# Patient Record
Sex: MALE | Race: WHITE | URBAN - METROPOLITAN AREA
[De-identification: names, ages, dates, MRNs, and addresses within clinical notes are randomized per-mention and may not be internally consistent; named-entity substitution may affect disease eponyms.]

---

## 2018-06-27 ENCOUNTER — HISTORICAL (OUTPATIENT)
Dept: ADMINISTRATIVE | Facility: HOSPITAL | Age: 48
End: 2018-06-27

## 2024-12-17 ENCOUNTER — OFFICE VISIT (OUTPATIENT)
Dept: URGENT CARE | Facility: CLINIC | Age: 54
End: 2024-12-17
Payer: COMMERCIAL

## 2024-12-17 VITALS
RESPIRATION RATE: 18 BRPM | WEIGHT: 192 LBS | HEART RATE: 81 BPM | BODY MASS INDEX: 28.44 KG/M2 | HEIGHT: 69 IN | OXYGEN SATURATION: 96 % | DIASTOLIC BLOOD PRESSURE: 79 MMHG | TEMPERATURE: 99 F | SYSTOLIC BLOOD PRESSURE: 128 MMHG

## 2024-12-17 DIAGNOSIS — J06.9 URI WITH COUGH AND CONGESTION: Primary | ICD-10-CM

## 2024-12-17 PROCEDURE — 96372 THER/PROPH/DIAG INJ SC/IM: CPT | Mod: ,,,

## 2024-12-17 PROCEDURE — 99203 OFFICE O/P NEW LOW 30 MIN: CPT | Mod: 25,,,

## 2024-12-17 RX ORDER — PROMETHAZINE HYDROCHLORIDE AND DEXTROMETHORPHAN HYDROBROMIDE 6.25; 15 MG/5ML; MG/5ML
5 SYRUP ORAL EVERY 6 HOURS PRN
Qty: 118 ML | Refills: 0 | Status: SHIPPED | OUTPATIENT
Start: 2024-12-17 | End: 2024-12-27

## 2024-12-17 RX ORDER — BETAMETHASONE SODIUM PHOSPHATE AND BETAMETHASONE ACETATE 3; 3 MG/ML; MG/ML
9 INJECTION, SUSPENSION INTRA-ARTICULAR; INTRALESIONAL; INTRAMUSCULAR; SOFT TISSUE
Status: COMPLETED | OUTPATIENT
Start: 2024-12-17 | End: 2024-12-17

## 2024-12-17 RX ADMIN — BETAMETHASONE SODIUM PHOSPHATE AND BETAMETHASONE ACETATE 9 MG: 3; 3 INJECTION, SUSPENSION INTRA-ARTICULAR; INTRALESIONAL; INTRAMUSCULAR; SOFT TISSUE at 08:12

## 2024-12-17 NOTE — PROGRESS NOTES
"Subjective:      Patient ID: Harsha Rivera is a 54 y.o. male.    Vitals:  height is 5' 9" (1.753 m) and weight is 87.1 kg (192 lb). His oral temperature is 99.4 °F (37.4 °C). His blood pressure is 128/79 and his pulse is 81. His respiration is 18 and oxygen saturation is 96%.     Chief Complaint: Sore Throat     Patient is a 54 y.o. male who presents to urgent care with complaints of hoarseness that began 6 days ago with sore throat, postnasal drip.  Patient does report some nasal congestion mild sinus pressure, rhinorrhea.  Does describe cough worsening at night over the last 3 days.  He reports his sore throat has improved.  Low-grade fever, T-max 99°  Alleviating factors include Sudafed and Mucinex with mild amount of relief. Patient denies known fever greater than 100.4, neck stiffness, rash, GI symptoms, shortness of breathe.     Sore Throat       HENT:  Positive for sore throat.       Objective:     Physical Exam   Constitutional: He is oriented to person, place, and time. He appears well-developed. He is cooperative.  Non-toxic appearance. He does not appear ill. No distress.   HENT:   Head: Normocephalic and atraumatic.   Ears:   Right Ear: Hearing, tympanic membrane, external ear and ear canal normal.   Left Ear: Hearing, tympanic membrane, external ear and ear canal normal.   Nose: Nose normal. No mucosal edema, rhinorrhea or nasal deformity. No epistaxis. Right sinus exhibits no maxillary sinus tenderness and no frontal sinus tenderness. Left sinus exhibits no maxillary sinus tenderness and no frontal sinus tenderness.   Mouth/Throat: Uvula is midline, oropharynx is clear and moist and mucous membranes are normal. Mucous membranes are moist. No trismus in the jaw. Normal dentition. No uvula swelling. No oropharyngeal exudate or posterior oropharyngeal edema.      Comments: Clear drip, mild O/P erythema  Eyes: Conjunctivae and lids are normal. No scleral icterus.   Neck: Trachea normal and phonation " normal. Neck supple. No edema present. No erythema present. No neck rigidity present.   Cardiovascular: Normal rate, regular rhythm, normal heart sounds and normal pulses.   Pulmonary/Chest: Effort normal and breath sounds normal. No respiratory distress. He has no decreased breath sounds. He has no wheezes. He has no rhonchi. He has no rales.   Abdominal: Normal appearance.   Musculoskeletal: Normal range of motion.         General: No deformity. Normal range of motion.   Lymphadenopathy:     He has no cervical adenopathy.   Neurological: He is alert and oriented to person, place, and time. He exhibits normal muscle tone. Coordination normal.   Skin: Skin is warm, dry, intact, not diaphoretic and not pale.   Psychiatric: His speech is normal and behavior is normal. Judgment and thought content normal.   Nursing note and vitals reviewed.      Assessment:     1. URI with cough and congestion        Plan:       URI with cough and congestion  -     promethazine-dextromethorphan (PROMETHAZINE-DM) 6.25-15 mg/5 mL Syrp; Take 5 mLs by mouth every 6 (six) hours as needed.  Dispense: 118 mL; Refill: 0  -     betamethasone acetate-betamethasone sodium phosphate injection 9 mg    Discussed likely viral etiologies.  Given signs and symptoms to monitor for for development of secondary bacterial infection.    Drink plenty of fluids. Get plenty of rest.   Claritin, Zyrtec or other over-the-counter antihistamine for runny nose, postnasal drip, nasal congestion.  Nasal spray such as Nasacort or Flonase for congestion.  Over-the-counter cough medication with expectorant such as guaifenesin for daytime cough.  Prescription cough syrup nightly as it may make you drowsy, do not drive while taking.  Prescription cough syrup has antihistamine present, do not combine with other antihistamines or cough medications.  Over-the-counter decongestants such as Sudafed, phenylephrine or pseudoephedrine.  Avoid these if you have a history of high  blood pressure.  Warm saltwater gargles for sore throat.  Warm water with honey to help coat the throat.  Throat lozenges.  Chloraseptic spray for worsening sore throat.  Tylenol or ibuprofen as needed for sore throat and fever.  May alternate every 3 hours.    Call or return to clinic as needed   Go to the ER with any significant change or worsening of symptoms.   Follow up with your primary care doctor.

## 2024-12-17 NOTE — PATIENT INSTRUCTIONS
Drink plenty of fluids. Get plenty of rest.   Claritin, Zyrtec or other over-the-counter antihistamine for runny nose, postnasal drip, nasal congestion.  Nasal spray such as Nasacort or Flonase for congestion.  Over-the-counter cough medication with expectorant such as guaifenesin for daytime cough.  Prescription cough syrup nightly as it may make you drowsy, do not drive while taking.  Prescription cough syrup has antihistamine present, do not combine with other antihistamines or cough medications.  Over-the-counter decongestants such as Sudafed, phenylephrine or pseudoephedrine.  Avoid these if you have a history of high blood pressure.  Warm saltwater gargles for sore throat.  Warm water with honey to help coat the throat.  Throat lozenges.  Chloraseptic spray for worsening sore throat.  Tylenol or ibuprofen as needed for sore throat and fever.  May alternate every 3 hours.    Call or return to clinic as needed   Go to the ER with any significant change or worsening of symptoms.   Follow up with your primary care doctor.

## 2024-12-17 NOTE — PROGRESS NOTES
Subjective:      Patient ID: Harsha Rivera is a 54 y.o. male.    Vitals:  vitals were not taken for this visit.     Chief Complaint: Sore Throat     Patient is a 54 y.o. male who presents to urgent care with complaints of sore throat, cough, and sinus congestion/runny nose x*** {DAYS, WEEKS, MONTHS:994299}. Alleviating factors include *** with {Desc; mild/moderate/copious:86588} relief. Patient denies ***.     Sore Throat     HENT:  Positive for sore throat.     Objective:     Physical Exam    Assessment:     No diagnosis found.    Plan:       There are no diagnoses linked to this encounter.

## 2024-12-18 ENCOUNTER — TELEPHONE (OUTPATIENT)
Dept: URGENT CARE | Facility: CLINIC | Age: 54
End: 2024-12-18
Payer: COMMERCIAL

## 2024-12-18 RX ORDER — CHLOPHEDIANOL HCL AND PYRILAMINE MALEATE 12.5; 12.5 MG/5ML; MG/5ML
10 SOLUTION ORAL
Qty: 200 ML | Refills: 0 | Status: SHIPPED | OUTPATIENT
Start: 2024-12-18 | End: 2024-12-18 | Stop reason: ALTCHOICE

## 2024-12-18 RX ORDER — CODEINE PHOSPHATE AND GUAIFENESIN 10; 100 MG/5ML; MG/5ML
5 SOLUTION ORAL NIGHTLY PRN
Qty: 118 ML | Refills: 0 | Status: SHIPPED | OUTPATIENT
Start: 2024-12-18

## 2024-12-18 NOTE — TELEPHONE ENCOUNTER
Patient has called saying that he was prescribed promethazine DM yesterday for cough, and he does not feel this medication is working.  He is requesting new cough medication.  Sending prescription for ryan cough.

## 2024-12-18 NOTE — TELEPHONE ENCOUNTER
Patient has called back with questions about ninja cough.  I have discussed with him that it is a antitussive and antihistamine combination cough medication.  He says that he does not want this medication and that he wants something with an expectorant in it since he is having a more productive cough that is worse at night.  He does not want to continue taking the promethazine DM even though it has only been prescribed to him a day ago, he says this does not work.  I discussed doing a nighttime use only of guaifenesin and codeine cough syrup.  He agrees to this medication.  Symptoms have been going on for about 6 days now, he does say that he no longer has any fever, however have discussed with him that if he is still having significant cough within the next 2-3 days I recommend he return to clinic for re-evaluation.  He verbalizes understanding.